# Patient Record
Sex: MALE | Race: WHITE | NOT HISPANIC OR LATINO
[De-identification: names, ages, dates, MRNs, and addresses within clinical notes are randomized per-mention and may not be internally consistent; named-entity substitution may affect disease eponyms.]

---

## 2020-02-16 ENCOUNTER — FORM ENCOUNTER (OUTPATIENT)
Age: 65
End: 2020-02-16

## 2020-03-02 ENCOUNTER — FORM ENCOUNTER (OUTPATIENT)
Age: 65
End: 2020-03-02

## 2021-03-31 ENCOUNTER — FORM ENCOUNTER (OUTPATIENT)
Age: 66
End: 2021-03-31

## 2021-10-21 ENCOUNTER — FORM ENCOUNTER (OUTPATIENT)
Age: 66
End: 2021-10-21

## 2022-01-03 ENCOUNTER — FORM ENCOUNTER (OUTPATIENT)
Age: 67
End: 2022-01-03

## 2022-01-23 ENCOUNTER — FORM ENCOUNTER (OUTPATIENT)
Age: 67
End: 2022-01-23

## 2022-01-25 ENCOUNTER — FORM ENCOUNTER (OUTPATIENT)
Age: 67
End: 2022-01-25

## 2022-01-30 ENCOUNTER — FORM ENCOUNTER (OUTPATIENT)
Age: 67
End: 2022-01-30

## 2022-01-31 ENCOUNTER — FORM ENCOUNTER (OUTPATIENT)
Age: 67
End: 2022-01-31

## 2022-02-02 ENCOUNTER — FORM ENCOUNTER (OUTPATIENT)
Age: 67
End: 2022-02-02

## 2022-02-15 ENCOUNTER — FORM ENCOUNTER (OUTPATIENT)
Age: 67
End: 2022-02-15

## 2022-02-16 ENCOUNTER — FORM ENCOUNTER (OUTPATIENT)
Age: 67
End: 2022-02-16

## 2022-02-21 ENCOUNTER — FORM ENCOUNTER (OUTPATIENT)
Age: 67
End: 2022-02-21

## 2022-02-22 ENCOUNTER — OUTPATIENT (OUTPATIENT)
Dept: OUTPATIENT SERVICES | Facility: HOSPITAL | Age: 67
LOS: 1 days | Discharge: HOME | End: 2022-02-22

## 2022-02-22 ENCOUNTER — TRANSCRIPTION ENCOUNTER (OUTPATIENT)
Age: 67
End: 2022-02-22

## 2022-02-22 VITALS
HEART RATE: 76 BPM | DIASTOLIC BLOOD PRESSURE: 60 MMHG | RESPIRATION RATE: 16 BRPM | TEMPERATURE: 99 F | SYSTOLIC BLOOD PRESSURE: 102 MMHG | OXYGEN SATURATION: 98 %

## 2022-02-22 VITALS
WEIGHT: 270.51 LBS | RESPIRATION RATE: 20 BRPM | HEIGHT: 72 IN | DIASTOLIC BLOOD PRESSURE: 80 MMHG | SYSTOLIC BLOOD PRESSURE: 128 MMHG | HEART RATE: 61 BPM | TEMPERATURE: 98 F | OXYGEN SATURATION: 96 %

## 2022-02-22 DEVICE — PUMP TITAN TOUCH ASSEMBLY: Type: IMPLANTABLE DEVICE | Status: FUNCTIONAL

## 2022-02-22 DEVICE — IMP PENILE TITAN CYL SET 0 DEG 24CM: Type: IMPLANTABLE DEVICE | Status: FUNCTIONAL

## 2022-02-22 DEVICE — SURGIFLO HEMOSTATIC MATRIX KIT: Type: IMPLANTABLE DEVICE | Status: FUNCTIONAL

## 2022-02-22 DEVICE — KIT PENILE TITAN ASSEMBLY STANDARD: Type: IMPLANTABLE DEVICE | Status: FUNCTIONAL

## 2022-02-22 DEVICE — RESERVIOR TITAN CLOVERLEAF 125CC: Type: IMPLANTABLE DEVICE | Status: FUNCTIONAL

## 2022-02-22 RX ORDER — ONDANSETRON 8 MG/1
4 TABLET, FILM COATED ORAL ONCE
Refills: 0 | Status: DISCONTINUED | OUTPATIENT
Start: 2022-02-22 | End: 2022-02-22

## 2022-02-22 RX ORDER — SODIUM CHLORIDE 9 MG/ML
1000 INJECTION, SOLUTION INTRAVENOUS
Refills: 0 | Status: DISCONTINUED | OUTPATIENT
Start: 2022-02-22 | End: 2022-02-22

## 2022-02-22 RX ORDER — ACETAMINOPHEN 500 MG
1000 TABLET ORAL ONCE
Refills: 0 | Status: DISCONTINUED | OUTPATIENT
Start: 2022-02-22 | End: 2022-02-22

## 2022-02-22 RX ORDER — FENTANYL CITRATE 50 UG/ML
25 INJECTION INTRAVENOUS
Refills: 0 | Status: DISCONTINUED | OUTPATIENT
Start: 2022-02-22 | End: 2022-02-22

## 2022-02-22 RX ADMIN — SODIUM CHLORIDE 100 MILLILITER(S): 9 INJECTION, SOLUTION INTRAVENOUS at 11:44

## 2022-02-22 RX ADMIN — SODIUM CHLORIDE 100 MILLILITER(S): 9 INJECTION, SOLUTION INTRAVENOUS at 15:47

## 2022-02-22 NOTE — DISCHARGE NOTE PROVIDER - CARE PROVIDER_API CALL
Yaniv Wood)  Urology  [address] [place] [address]  Phone: [phone]  Fax: [phone]  Follow Up Time:

## 2022-03-08 ENCOUNTER — FORM ENCOUNTER (OUTPATIENT)
Age: 67
End: 2022-03-08

## 2022-10-04 NOTE — DISCHARGE NOTE PROVIDER - CARE PROVIDERS DIRECT ADDRESSES
Renal Note    Reason for Consult- Hyponatremia    Subjective  Seen and examined. Labs/chart reviewed. No acute events overnight. Stable      Objective:    Current Medications:    Current Facility-Administered Medications   Medication Dose Route Frequency Provider Last Rate Last Admin   • insulin glargine (LANTUS) injection 26 Units  26 Units Subcutaneous 2 times per day Ro Del Cid MD   26 Units at 10/04/22 0856   • zolpidem (AMBIEN) tablet 5 mg  5 mg Oral Nightly PRN Ro Del Cid MD       • melatonin tablet 3 mg  3 mg Oral Nightly Ro Del Cid MD       • insulin lispro (ADMELOG, HumaLOG) injection 13 Units  13 Units Subcutaneous TID WC Rodger Chacon MD   13 Units at 10/04/22 1209   • HYDROcodone-acetaminophen (NORCO)  MG per tablet 1 tablet  1 tablet Oral Q4H PRN Birdie Velasco MD   1 tablet at 10/04/22 0831   • [START ON 10/5/2022] memantine (NAMENDA) tablet 5 mg  5 mg Oral 2 times per day Ariella Dumont MD       • [START ON 10/12/2022] memantine (NAMENDA) tablet 10 mg  10 mg Oral Daily Ariella Dumont MD       • [START ON 10/12/2022] memantine (NAMENDA) tablet 5 mg  5 mg Oral Daily Ariella Dumont MD       • [START ON 10/19/2022] memantine (NAMENDA) tablet 10 mg  10 mg Oral 2 times per day Ariella Dumont MD       • hydrALAZINE (APRESOLINE) tablet 25 mg  25 mg Oral 4x Daily PRN Rhonda Man MD   25 mg at 09/27/22 2009   • amLODIPine (NORVASC) tablet 2.5 mg  2.5 mg Oral Daily Rhonda Man MD   2.5 mg at 10/04/22 0851   • sodium chloride 0.9 % flush bag 250 mL  250 mL Intravenous Once PRN Jayde Cook MD       • heparin (porcine) 100 UNIT/ML lock flush 500 Units  5 mL Intracatheter Once PRN Jayde Cook MD       • sodium chloride 0.9 % flush bag 250 mL  250 mL Intravenous Once PRN Jayde Cook MD       • heparin (porcine) 100 UNIT/ML lock flush 500 Units  5 mL Intracatheter Once PRN Jayde Cook MD       • sodium chloride (NORMAL SALINE) 0.9 % bolus 1,000 mL  1,000 mL Intravenous Once PRN  [email or web address] Jayde Cook MD       • sodium chloride (NORMAL SALINE) 0.9 % bolus 1,000 mL  1,000 mL Intravenous Once PRN Jayde Cook  mL/hr at 09/26/22 1732 1,000 mL at 09/26/22 1732   • EPINEPHrine (ADRENALIN) injection 0.3 mg  0.3 mg Intramuscular Q5 Min PRN Jayde Cook MD       • diphenhydrAMINE (BENADRYL) injection 50 mg  50 mg Intravenous Once PRN Jayde Cook MD       • famotidine (PEPCID) injection 20 mg  20 mg Intravenous Once PRN Jayde Cook MD       • methylPREDNISolone (SOLU-Medrol) PF injection 125 mg  125 mg Intravenous Once PRN Jayde Cook MD       • albuterol inhaler 2 puff  2 puff Inhalation Q20 Min PRN Jayde Cook MD       • albuterol (VENTOLIN) nebulizer 5 mg  5 mg Nebulization Q20 Min PRN Jayde Cook MD       • dexAMETHasone (DECADRON) tablet 4 mg  4 mg Oral 4 times per day Rhonda Man MD   4 mg at 10/04/22 1209   • [Held by provider] enalapril (VASOTEC) tablet 20 mg  20 mg Oral Daily Rhonda Man MD   20 mg at 09/25/22 0808   • sodium chloride 0.9 % flush bag 250 mL  250 mL Intravenous Once PRN Jayde Cook MD       • heparin (porcine) 100 UNIT/ML lock flush 500 Units  5 mL Intracatheter Once PRN Jayde Cook MD       • prochlorperazine (COMPAZINE) tablet 10 mg  10 mg Oral Q6H PRN Jayde Cook MD       • sodium chloride (NORMAL SALINE) 0.9 % bolus 1,000 mL  1,000 mL Intravenous Once PRN Jayde Cook MD       • sodium chloride (NORMAL SALINE) 0.9 % bolus 1,000 mL  1,000 mL Intravenous Once PRN Jayde Cook MD 50 mL/hr at 09/24/22 1531 1,000 mL at 09/24/22 1531   • EPINEPHrine (ADRENALIN) injection 0.3 mg  0.3 mg Intramuscular Q5 Min PRN Jayde Cook MD       • diphenhydrAMINE (BENADRYL) injection 50 mg  50 mg Intravenous Once PRN Jayde Cook MD       • famotidine (PEPCID) injection 20 mg  20 mg Intravenous Once PRN Jayde Cook MD   20 mg at 09/24/22 2059   • methylPREDNISolone (SOLU-Medrol) PF injection 125 mg  125 mg Intravenous Once PRN Jayde Cook MD       • albuterol  inhaler 2 puff  2 puff Inhalation Q20 Min PRN Jayde Cook MD       • albuterol (VENTOLIN) nebulizer 5 mg  5 mg Nebulization Q20 Min PRN Jayde Cook MD       • hydrALAZINE (APRESOLINE) injection 10 mg  10 mg Intravenous Q6H PRN Yady Ortez MD   10 mg at 09/19/22 1806   • insulin lispro (ADMELOG,HumaLOG) - Correction Dose   Subcutaneous TID WC Yady Ortez MD   6 Units at 10/04/22 0853   • insulin lispro (ADMELOG,HumaLOG) - Correction Dose   Subcutaneous Nightly Yady Ortez MD   3 Units at 09/23/22 2138   • ondansetron (ZOFRAN) injection 4 mg  4 mg Intravenous BID PRN Jacinta Zarate MD       • prochlorperazine (COMPAZINE) injection 5 mg  5 mg Intravenous Q4H PRN Jacinta Zarate MD       • acetaminophen (TYLENOL) tablet 650 mg  650 mg Oral Q4H PRN Jacinta Zarate MD       • HYDROcodone-acetaminophen (NORCO) 5-325 MG per tablet 1 tablet  1 tablet Oral Q4H PRN Jacinta Zarate MD   1 tablet at 09/28/22 2356   • polyethylene glycol (MIRALAX) packet 17 g  17 g Oral Daily PRN Jacinta Zarate MD   17 g at 10/03/22 1743   • docusate sodium-sennosides (SENOKOT S) 50-8.6 MG 2 tablet  2 tablet Oral Daily PRN Jacinta Zarate MD       • sodium chloride 0.9 % flush bag 25 mL  25 mL Intravenous PRN Jacinta Zarate MD       • dextrose 50 % injection 25 g  25 g Intravenous PRN Jacinta Zarate MD       • dextrose 50 % injection 12.5 g  12.5 g Intravenous PRN Jacinta Zarate MD       • glucagon (GLUCAGEN) injection 1 mg  1 mg Intramuscular PRN Jacinta Zarate MD       • dextrose (GLUTOSE) 40 % gel 15 g  15 g Oral PRN Jacinta Zarate MD       • dextrose (GLUTOSE) 40 % gel 30 g  30 g Oral PRN Jacinta Zarate MD       • sodium chloride 0.9 % flush bag 25 mL  25 mL Intravenous PRN Jacinta Zarate MD       • sodium chloride (PF) 0.9 % injection 2 mL  2 mL Intracatheter 2 times per day Jacinta Zarate MD   2 mL at 10/04/22 0918   • heparin (porcine) injection 5,000 Units  5,000 Units Subcutaneous 3 times per day Jacinta Zarate MD   5,000 Units at 10/04/22 1424   •  carvedilol (COREG) tablet 6.25 mg  6.25 mg Oral 2 times per day Jacinta Zarate MD   6.25 mg at 10/04/22 0852   • famotidine (PEPCID) tablet 20 mg  20 mg Oral Daily Jacinta Zarate MD   20 mg at 10/04/22 0851   • finasteride (PROSCAR) tablet 5 mg  5 mg Oral Daily Jacinta Zarate MD   5 mg at 10/04/22 0852   • atorvastatin (LIPITOR) tablet 10 mg  10 mg Oral Nightly Jacinta Zarate MD   10 mg at 10/03/22 2056   • tamsulosin (FLOMAX) capsule 0.4 mg  0.4 mg Oral Daily Jacinta Zarate MD   0.4 mg at 10/04/22 0851          Physical Exam:    Blood pressure (!) 143/74, pulse 76, temperature 97.7 °F (36.5 °C), temperature source Oral, resp. rate 20, height 5' 7\" (1.702 m), weight 66.9 kg (147 lb 7.8 oz), SpO2 100 %.       Intake/Output Summary (Last 24 hours) at 10/4/2022 1451  Last data filed at 10/4/2022 0600  Gross per 24 hour   Intake --   Output 1825 ml   Net -1825 ml       Gen: Oriented X 3, NAD  Skin: no rashes, no lesions  HEENT: anicteric, neck supple  Neck: supple, no JVD, trachea midline  Chest: CTAB, No wheezing, no crackles  CV: S1S2 nml, RRR, no murmur, gallop, rub  Abd: Soft NT/ND, no HSM, +BS normoactive  Ext: No Edema  Neuro: No focal deficit  Access:    Labs:   Recent Labs     10/02/22  0615 10/03/22  1120 10/04/22  0733   SODIUM 133* 137 137   POTASSIUM 5.5* 4.4 5.0   CO2 25 24 25   ANIONGAP 12 13 11   GLUCOSE 317* 262* 236*   BUN 55* 55* 51*   CREATININE 0.96 1.03 0.88   BCRAT 57* 53* 58*   CALCIUM 8.5 8.1* 8.3*        Recent Labs     10/02/22  0615   WBC 3.9*   RBC 3.33*   HGB 10.3*   HCT 30.5*   PLT 90*   MCV 91.6   MCH 30.9   MCHC 33.8   NRBCRE 0        Imaging:    LAST MRI:  === 09/16/22 ===    MRI THORACIC SPINE W WO CONTRAST    - Narrative -  EXAMINATION: Magnetic resonance imaging (MRI) of the thoracic spine without  and with contrast    HISTORY: Right lung mass. Brain metastases.    TECHNIQUE: MRI of the thoracic spine was performed prior to and following  the uneventful administration of Gadavist 7.5 mL  intravenous gadolinium  contrast according to standard protocol.    COMPARISON: CT from 09/16/2022.    FINDINGS:    There is an enhancing intramedullary lesion in the distal spinal cord at  the T11 and T12 levels extending about 4.4 cm craniocaudally and measuring  0.7 x 0.6 cm in the axial plane. There is associated vasogenic edema in the  distal spinal cord and conus extending from the level of T9 through L1 with  mild cord expansion.    The alignment is normal. Vertebral bodies are normal in height without  evidence of compression fractures. Marrow signal intensity appears normal.  There is no evidence of osseous metastatic disease in the thoracic spine.  There is mild multilevel degenerative disc disease in the upper-mid  thoracic spine with decreased disc height and desiccation. No significant  disc herniation is seen. No central canal stenosis is seen. The facets  appear normal. No neural foraminal stenosis is seen.    There is a mass in the right lung upper lobe consistent with malignancy and  suspicious for primary lung carcinoma. There is a partially imaged mass in  the right upper retroperitoneum adjacent to the right kidney measuring  about 7 cm consistent with malignancy and suspicious for a metastasis.  There is no direct extension of these masses to the thoracic spine.    - Impression -  1. Enhancing intramedullary lesion in the distal spinal cord at the T11 and  T12 levels measuring 4.4 cm in length with associated vasogenic edema  extending from T9 through L1. 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 Acute transverse myelitis  considered less likely.    2. No evidence of osseous or epidural metastatic disease in the thoracic  spine.    3. Masses in the right lung upper lobe and right upper retroperitoneum  consistent with malignancy, likely stage IV lung cancer.    Electronically Signed by: PAM UNDERWOOD MD  Signed on: 9/19/2022 3:07  PM    ___________________________________________________________________________    MRI LUMBAR SPINE W WO CONTRAST    - Narrative -  EXAM: MRI LUMBAR SPINE W WO CONTRAST    HISTORY: 64 year old  male  with  a  history of CAD carvedilol, PVD, CVA,  DM, cardiac and peripheral stents, DM.    Presented to the ER at [facility] with the left leg swelling and weakness and generalized  weakness.    TECHNIQUE: MRI of the lumbar spine was performed with and without the  contrast according to standard protocol.  6 mL of intravenous Gadavist was  administered.    Comparison: CT chest dated 09/16/2022.    FINDINGS:    The alignment is normal. Vertebral bodies are normal in height without  evidence of compression fractures. Marrow signal intensity is normal. The  conus medullaris terminates at the level of T12/L1.  Focal enhancement at  the tip of the conus noted with abnormal T2 hyperintensity. This is  incompletely evaluated.  This is seen only on the sagittal postcontrast  images.  Metastatic disease cannot be excluded.  Correlation with contrast  MRI of the thoracic spine is recommended.    The nerve roots of the cauda equina distribute normally within the thecal  sac. The filum terminale is unremarkable. Visualized sacrum and iliac bones  are unremarkable.    There is a lobulated heterogeneously enhancing mass at the medial aspect of  the right kidney measuring 5.0 x 7.3 cm.  This is concerning for renal  neoplasm.  Additional 1.7 cm soft tissue nodule posterior to the lower pole  of the right kidney also seen.  Bilateral adrenals are thickened.  The  previously noted soft tissue mass posterior to the spleen is not imaged in  the current study.  There is a left periaortic enlarged lymph node  measuring 2.1 x 1.3 cm.      L1-L2: There is no disc bulge. There is no central canal stenosis. There is  no facet osteoarthritis. There is no neural foraminal stenosis.    L2-L3: There is no disc bulge. There is no  central canal stenosis. There is  no facet osteoarthritis. There is no neural foraminal stenosis.    L3-L4: There is no disc bulge. There is no central canal stenosis. There is  mild facet osteoarthritis. There is no neural foraminal stenosis.    L4-L5: There is mild disc bulge. There is borderline central canal  stenosis. There is mild facet osteoarthritis. There is no neural foraminal  stenosis.    L5-S1: There is minimal disc bulge. There is no central canal stenosis.  There is mild facet osteoarthritis. There is no neural foraminal stenosis.    - Impression -  1. There is no evidence of disc herniation or any significant central  stenosis.  Borderline central stenosis at L4/L5 level.  The visualized  marrow signal appears normal.    2. Focal enhancement at the tip of the conus noted with abnormal T2  hyperintensity. This is incompletely evaluated.  Metastatic disease cannot  be excluded.  Correlation with contrast MRI of the thoracic spine is  recommended.    3. There is a lobulated heterogeneously enhancing mass at the medial aspect  of the right kidney measuring 5.0 x 7.3 cm.  This is concerning for renal  neoplasm.  Additional 1.7 cm soft tissue nodule posterior to the lower pole  of the right kidney also seen.  Bilateral adrenals are thickened.  The  previously noted soft tissue mass posterior to the spleen is not imaged in  the current study.  There is a left periaortic enlarged lymph node  measuring 2.1 x 1.3 cm. Consider further evaluation with CT of the abdomen  and pelvis.    Electronically Signed by: FAUSTINO TAPIA MD  Signed on: 9/18/2022 8:50 AM    LAST CT:  === 09/16/22 ===    CT GUIDED BIOPSY    - Narrative -  CT-guided right retroperitoneal mass biopsy on 09/20/2022:    Indication: Metastatic disease, right retroperitoneal mass, right upper  lobe pulmonary mass, brain metastases    : Georgia Collier M.D.    Medication: 1 mg IV Versed and 50 mcg IV fentanyl.  Moderate conscious sedation was  provided throughout the procedure by  interventional radiology nursing personnel using Fentanyl and Versed  administered intravenously.  The nurse (independent trained observer)  assisted in continuously monitoring the patient's level of consciousness  and physiologic status throughout the entire procedure.  I (attending  physician) was present throughout the procedure and supervised/directed the  sedation administered to the patient.  Start time: 1041  End time: 1109    The risks and benefits of the procedure were explained to the patient after  which time written consent was obtained. Patient was placed prone and  headfirst in the CT gantry and axial contiguous images through the abdomen  were obtained after placement of radiopaque skin markers overlying the  right back.    Images demonstrate irregular soft tissue mass in the right retroperitoneum  measuring up to 6.5 x 5.5 cm.    Verbal timeout was performed. Anatomic landmarks were evaluated and skin  overlying the mass was marked. Patient was prepped and draped in usual  sterile fashion. 10 mL lidocaine was infused superficially and deep.  Conscious sedation was administered and monitored by the radiologist.  Subsequently, a 17-gauge, coaxial needle was introduced into the mass. The  coaxial needle was observed under CT guidance to be within the mass.  Subsequently, 22g FNA needle was advances and multiple aspirations were  performed. Then,  18-gauge Temno biopsy tool was introduced through the  coaxial needle and into the mass. Four separate passes were obtained, all  of which received adequate sample.  The biopsy tool and introducer needle  were withdrawn and manual pressure held until hemostasis was obtained. A  sterile Band-Aid was applied. Postprocedural images demonstrated no  evidence of hemorrhage.  Patient tolerated the procedure without complaint.  Patient left the CT department in stable condition. Samples were sent to  the laboratory for further  evaluation.    - Impression -  Impression:    Successful CT guided right retroperitoneal mass biopsy with samples sent to  the laboratory for further evaluation.    Electronically Signed by: JJ MOELLER M.D.  Signed on: 9/21/2022 9:21 AM    ___________________________________________________________________________    CT HEAD WO CONTRAST    - Narrative -  EXAMINATION: Computed tomography (CT) of the head without contrast    HISTORY: Left leg weakness. Lung mass    TECHNIQUE: CT of the head was performed without contrast according to  standard protocol.    COMPARISON: No prior study available for comparison.    FINDINGS:    There are multiple mass lesions in the brain consistent with metastases.  This includes a 2.3 cm hyperattenuating mass in the right frontal lobe, a  1.3 cm mass in the left frontal lobe, a 1.0 cm mass in the left temporal  lobe, a 1.3 cm mass in the right occipital lobe, a small hypoattenuating  lesion in the left occipital lobe, as well as two lesions in the left  cerebellar hemisphere with the largest measuring 2.3 cm. There is vasogenic  edema associated with these lesions. There is no midline shift or internal  brain herniation. There is mild cerebral volume loss. The ventricles appear  nondilated. The basilar cisterns appear patent. Other than cataract  extractions, the imaged portions of the orbits, paranasal sinuses, and  mastoids appear normal. No acute fracture is seen.    - Impression -  Multiple metastatic lesions in bilateral cerebral hemispheres and left  cerebellar hemisphere with the largest measuring about 2.3 cm with  vasogenic edema. No midline shift or herniation.    Electronically Signed by: PAM UNDERWOOD MD  Signed on: 9/16/2022 3:28 PM    ___________________________________________________________________________    CTA CHEST PULMONARY EMBOLISM W CONTRAST    - Narrative -  EXAM: CTA CHEST PULMONARY EMBOLISM W CONTRAST    HISTORY:  PE suspected, high prob  Left leg  weakness    COMPARISON:  None.    TECHNIQUE:  CT angiogram of the chest was performed with IV contrast as per pulmonary  embolus protocol.  3-D Maximum Intensity Projections were performed on an  independent workstation with concurrent supervision of a radiologist.  Multiplanar reformats were performed and evaluated.    Contrast: Patient was administered 75 ml of Omnipaque 350without immediate  complications.    FINDINGS:    The pulmonary artery shows no gross filling defect.  Multivessel coronary  artery calcification is seen.  Heart size is normal.  There is a lobulated  necrotic appearing soft tissue mass medial to the right kidney measuring  4.7 x 4.7 cm.  This is partially visualized.  This impresses upon the right  kidney.  There is also a lobular soft tissue lesion posterior to the spleen  measuring 1.9 cm.  No significant mediastinal or hilar adenopathy is  present.  No pleural effusions are present.  No bony destructive changes  are present.  There is a lobulated soft tissue mass with irregular borders  seen within the right lung apex measuring 3.3 x 4.3 x 4.4 cm.  This abuts  the lateral side of the pleura without adjacent bony destruction.  There is  also surrounding coarse reticular lung densities.  Bullous changes are seen  at the lung apices.  The airway is patent.    - Impression -  1.    No PE.  2.    Lobulated irregular soft tissue mass within the right lung apex  abutting the pleura concerning for neoplasm.  3.    There is partially visualized lobular low-density soft tissue mass  medial to the right kidney with regional mass effect on the adjacent  kidney.  This is partially visualized.  There is also a smaller lobular  lesion posterior to the spleen also concerning for neoplasm.    Electronically Signed by: MADIHA GUARDADO D.O.  Signed on: 9/16/2022 3:45 PM    LAST X-RAY:  === 09/16/22 ===    XR CHEST PA OR AP 1 VIEW    - Narrative -  EXAMINATION:  XR CHEST PA OR AP 1 VIEW.      INDICATION: Weakness;  hypotension.    COMPARISON: None.    - Impression -  FINDINGS/IMPRESSION:    There are degenerative changes of the thoracic spine and bilateral  acromioclavicular joints.  The heart size is normal.  There is tortuosity  of the thoracic aorta    There is suggestion of a pleural-based right apical upper lobe mass; if  indicated, further evaluation with non-emergent CT scanner chest is  suggested.    There is no evidence of alveolar consolidation or pneumothorax.    Electronically Signed by: RYAN BAUER MD  Signed on: 9/16/2022 1:59 PM    LAST U/S:  No results found for this or any previous visit.      ASSESSMENT/PLAN    Hyponatremia  - Hyponatremia secondary to SIADH likely related to pain/malignancy.  It is too early for chemotherapy to have caused hyponatremia  - S/p tolvaptan 15 mg once on 9/25  - 1 L p.o. fluid restriction  -Serum sodium improved. Will monitor sodium daily    Hyperkalemia  - Likely secondary to LUIZ blockade.    - uric acid level 7.4 and CK wnl  - enalapril held  - Better s/p Lokelma   - monitor    Metastatic neuroendocrine tumor of the lung  -s/p carboplatin, etoposide, and atezolizumab 9/24    Diabetes with hyperglycemia    Anemia    Hypertension  - Currently well controlled      Thank you for allowing me to participate in the care. Will follow closely with you.

## 2022-11-02 PROBLEM — Z00.00 ENCOUNTER FOR PREVENTIVE HEALTH EXAMINATION: Status: ACTIVE | Noted: 2022-11-02

## 2022-11-03 ENCOUNTER — APPOINTMENT (OUTPATIENT)
Dept: UROLOGY | Facility: CLINIC | Age: 67
End: 2022-11-03

## 2022-11-03 VITALS
WEIGHT: 280 LBS | HEIGHT: 72 IN | TEMPERATURE: 98 F | SYSTOLIC BLOOD PRESSURE: 122 MMHG | BODY MASS INDEX: 37.93 KG/M2 | DIASTOLIC BLOOD PRESSURE: 80 MMHG

## 2022-11-03 DIAGNOSIS — Z80.42 FAMILY HISTORY OF MALIGNANT NEOPLASM OF PROSTATE: ICD-10-CM

## 2022-11-03 DIAGNOSIS — E78.00 PURE HYPERCHOLESTEROLEMIA, UNSPECIFIED: ICD-10-CM

## 2022-11-03 DIAGNOSIS — K21.9 GASTRO-ESOPHAGEAL REFLUX DISEASE W/OUT ESOPHAGITIS: ICD-10-CM

## 2022-11-03 DIAGNOSIS — R35.0 FREQUENCY OF MICTURITION: ICD-10-CM

## 2022-11-03 DIAGNOSIS — Z78.9 OTHER SPECIFIED HEALTH STATUS: ICD-10-CM

## 2022-11-03 DIAGNOSIS — M19.90 UNSPECIFIED OSTEOARTHRITIS, UNSPECIFIED SITE: ICD-10-CM

## 2022-11-03 DIAGNOSIS — Z87.438 PERSONAL HISTORY OF OTHER DISEASES OF MALE GENITAL ORGANS: ICD-10-CM

## 2022-11-03 PROCEDURE — 99214 OFFICE O/P EST MOD 30 MIN: CPT | Mod: 25

## 2022-11-03 PROCEDURE — 51798 US URINE CAPACITY MEASURE: CPT | Mod: 59

## 2022-11-03 PROCEDURE — 51741 ELECTRO-UROFLOWMETRY FIRST: CPT

## 2022-11-03 RX ORDER — TERBINAFINE HYDROCHLORIDE 250 MG/1
250 TABLET ORAL
Refills: 0 | Status: ACTIVE | COMMUNITY

## 2022-11-03 NOTE — HISTORY OF PRESENT ILLNESS
[FreeTextEntry1] : Patient presenting s/p IPP 2/2022\par Overall doing well. Main complaint is intermittent swelling of the scrotum. Not associated with pain.\par No fevers, chills, drainage. \par Patient able to use the device without difficulty. Does not degree of autoinflation.\par \par In terms of his urination, patient is doing well.\par No complaints. No sensation of incomplete emptying or weak stream.

## 2022-11-03 NOTE — ASSESSMENT
[FreeTextEntry1] : BLADDER SCAN:\par Indication: Increased frequency of urination. \par Initial Volume: 706   cc\par PVR: 93   cc\par Results: Stress urinary incontinence. \par Recommendations: No Therapy Needed.\par \par \par URO FLOWMETRY:\par Indication: Increased frequency of urination. \par Urinary Flow Rate:  23.3  m/s\par Results: Stress urinary incontinence    \par Recommendations: No therapy needed.\par \par Swelling in scrotum does not appear to be infectious in nature, however cannot absolutely rule out subacute infection\par Patient counseled to come back to office if swelling worsens.\par Otherwise followup 3 months\par \par

## 2022-11-03 NOTE — PHYSICAL EXAM
[General Appearance - Well Developed] : well developed [General Appearance - Well Nourished] : well nourished [Abdomen Soft] : soft

## 2022-12-22 ENCOUNTER — APPOINTMENT (OUTPATIENT)
Dept: UROLOGY | Facility: CLINIC | Age: 67
End: 2022-12-22

## 2022-12-22 VITALS
HEIGHT: 72 IN | WEIGHT: 280 LBS | TEMPERATURE: 97 F | DIASTOLIC BLOOD PRESSURE: 74 MMHG | BODY MASS INDEX: 37.93 KG/M2 | SYSTOLIC BLOOD PRESSURE: 122 MMHG

## 2022-12-22 DIAGNOSIS — Z96.0 PRESENCE OF UROGENITAL IMPLANTS: ICD-10-CM

## 2022-12-22 PROCEDURE — 99215 OFFICE O/P EST HI 40 MIN: CPT

## 2022-12-22 RX ORDER — ATORVASTATIN CALCIUM 80 MG/1
TABLET, FILM COATED ORAL
Refills: 0 | Status: ACTIVE | COMMUNITY

## 2022-12-22 RX ORDER — TAMSULOSIN HCL 0.4 MG
0.4 CAPSULE ORAL
Refills: 0 | Status: ACTIVE | COMMUNITY

## 2022-12-22 RX ORDER — ESOMEPRAZOLE MAGNESIUM 40 MG/1
40 CAPSULE, DELAYED RELEASE ORAL
Refills: 0 | Status: ACTIVE | COMMUNITY

## 2022-12-22 NOTE — HISTORY OF PRESENT ILLNESS
[FreeTextEntry1] : Follow-up visit for S/P Inertion of IPP on 2/22/2022 - [facility]\par Overall doing well. Main complaint is intermittent swelling of the scrotum. Not associated with pain.\par No fevers, chills, drainage. \par \par

## (undated) DEVICE — BAG DECANTER IV STERILE

## (undated) DEVICE — MARKING PEN DEVON DUAL TIP W RULER

## (undated) DEVICE — DRSG COMBINE 5X9"

## (undated) DEVICE — SLV COMPRESSION KNEE MED

## (undated) DEVICE — SUT PROLENE 4-0 14" V-47

## (undated) DEVICE — LONE STAR DILAMEZINSERT INSERTER BLUE 12MM

## (undated) DEVICE — PREP CHLORAPREP HI-LITE ORANGE 26ML

## (undated) DEVICE — SUT PDS II 3-0 27" RB-1

## (undated) DEVICE — PREP SCRUB BRUSH W CHG 4%

## (undated) DEVICE — SUPP ATHLETIC MALE XLG 44-55IN

## (undated) DEVICE — BAG SPONGE COUNTER EZ

## (undated) DEVICE — SUT PDS II 2-0 27" SH

## (undated) DEVICE — PACK PROST PENILE LNX SURGICOUNT

## (undated) DEVICE — TAPE UMBILICAL 1/8 X 18" STRANDS

## (undated) DEVICE — DRAIN URINARY LEG BAG WITH FLIP-FLO VALVE 32OZ

## (undated) DEVICE — WARMING BLANKET UPPER ADULT

## (undated) DEVICE — DRSG DERMABOND 0.7ML

## (undated) DEVICE — LONE STAR ELASTIC STAYS 12MM BLUNT

## (undated) DEVICE — GLV 9 PROTEXIS (WHITE)

## (undated) DEVICE — SUT VICRYL 3-0 27" RB-1 UNDYED